# Patient Record
Sex: MALE | Race: WHITE | URBAN - NONMETROPOLITAN AREA
[De-identification: names, ages, dates, MRNs, and addresses within clinical notes are randomized per-mention and may not be internally consistent; named-entity substitution may affect disease eponyms.]

---

## 2021-04-05 ENCOUNTER — OFFICE VISIT (OUTPATIENT)
Dept: FAMILY MEDICINE CLINIC | Age: 26
End: 2021-04-05
Payer: COMMERCIAL

## 2021-04-05 VITALS
DIASTOLIC BLOOD PRESSURE: 80 MMHG | SYSTOLIC BLOOD PRESSURE: 118 MMHG | OXYGEN SATURATION: 98 % | HEART RATE: 68 BPM | TEMPERATURE: 97.8 F | WEIGHT: 210 LBS

## 2021-04-05 DIAGNOSIS — R07.0 PAIN IN THROAT: ICD-10-CM

## 2021-04-05 DIAGNOSIS — R07.0 PAIN IN THROAT: Primary | ICD-10-CM

## 2021-04-05 DIAGNOSIS — J02.9 ACUTE VIRAL PHARYNGITIS: ICD-10-CM

## 2021-04-05 LAB — S PYO AG THROAT QL: NORMAL

## 2021-04-05 PROCEDURE — 87880 STREP A ASSAY W/OPTIC: CPT | Performed by: PHYSICIAN ASSISTANT

## 2021-04-05 PROCEDURE — 99213 OFFICE O/P EST LOW 20 MIN: CPT | Performed by: PHYSICIAN ASSISTANT

## 2021-04-05 NOTE — PROGRESS NOTES
Date: 21     Sindy Neff   : 1995 Sex: male  Age: 30 y.o. Subjective:  Chief Complaint   Patient presents with    Pharyngitis     x 2 days        HPI: The patient has had a sore throat for the last 2days. Patient states pain with swallowing but no difficulty swallowing. No chronic fatigue. Patient denies cough runny nose and nasal congestion. Denies fever/chills. No decrease appetite or activity level. No vomiting or diarrhea. No loss of taste or smell myalgias or fatigue. No contact exposures. ROS: Unless otherwise stated in this report the patient's positive and negative responses for review of systems for constitutional, eyes, ENT, cardiovascular, respiratory, gastrointestinal, neurological, , musculoskeletal, and integument systems and related systems to the presenting problem are either stated in the history of present illness or were not pertinent or were negative for the symptoms and/or complaints related to the presenting medical problem. Positives and pertinent negatives as per HPI. All others reviewed and are negative. No current outpatient medications on file. No Known Allergies     No past medical history on file. No family history on file. No past surgical history on file.    Social History     Socioeconomic History    Marital status: Unknown     Spouse name: Not on file    Number of children: Not on file    Years of education: Not on file    Highest education level: Not on file   Occupational History    Not on file   Social Needs    Financial resource strain: Not on file    Food insecurity     Worry: Not on file     Inability: Not on file    Transportation needs     Medical: Not on file     Non-medical: Not on file   Tobacco Use    Smoking status: Not on file   Substance and Sexual Activity    Alcohol use: Not on file    Drug use: Not on file    Sexual activity: Not on file   Lifestyle    Physical activity     Days per week: Not on file     Minutes per session: Not on file    Stress: Not on file   Relationships    Social connections     Talks on phone: Not on file     Gets together: Not on file     Attends Restorationism service: Not on file     Active member of club or organization: Not on file     Attends meetings of clubs or organizations: Not on file     Relationship status: Not on file    Intimate partner violence     Fear of current or ex partner: Not on file     Emotionally abused: Not on file     Physically abused: Not on file     Forced sexual activity: Not on file   Other Topics Concern    Not on file   Social History Narrative    Not on file        Objective:  Vitals:    04/05/21 1142   BP: 118/80   Pulse: 68   Temp: 97.8 °F (36.6 °C)   TempSrc: Temporal   SpO2: 98%   Weight: 210 lb (95.3 kg)        Const: Appears healthy and well developed. No signs of acute distress present. Non-toxic appearing. Head/Face: Normocephalic, atraumatic. Facies is symmetric. Eyes: Pupils equal, round and reactive to light. ENMT: Nares are patent. Buccal mucosa moist.  The patient has mild tonsillar hypertrophy and erythema noted in posterior pharynx without exudate. No edema of oropharynx no evidence of peritonsillar abscess. No asymmetrical swelling. Tympanic Membranes are pearly gray with good light reflex bilaterally. Neck: Neck is supple. Trachea midline. No palpable adenopathy. Resp: No respiratory distress. Lungs clear to auscultation bilaterally. No accessory muscle use. CV: Rhythm is regular. S1 is normal. S2 is normal. No murmurs rubs or clicks. Musculo: Pulses are equal bilaterally. Patient appears to move extremities without limitation. Skin: Dry and warm. Good turgor    Neuro: Alert and oriented appropriate for patient's age. Psych: Mood/Affect: Patient's mood and affect is appropriate for age. Active and playful in room interacting with parents as well as examiner and is nontoxic in appearance.        Emiliano MORALES was seen today for pharyngitis. Diagnoses and all orders for this visit:    Pain in throat  -     POCT rapid strep A  -     Culture, Throat; Future    Acute viral pharyngitis    Rest fluids salt water gargles ER warning signs given    Return for Follow up with PCP in 5 days for re-evaluation. .    Seen By:    JACQUIE Lopez

## 2021-04-07 LAB — THROAT CULTURE: NORMAL
